# Patient Record
Sex: MALE | Race: WHITE | NOT HISPANIC OR LATINO | Employment: FULL TIME | URBAN - METROPOLITAN AREA
[De-identification: names, ages, dates, MRNs, and addresses within clinical notes are randomized per-mention and may not be internally consistent; named-entity substitution may affect disease eponyms.]

---

## 2023-05-08 LAB
ALANINE AMINOTRANSFERASE (SGPT) (U/L) IN SER/PLAS: 33 U/L (ref 10–52)
ALBUMIN (G/DL) IN SER/PLAS: 4.6 G/DL (ref 3.4–5)
ALKALINE PHOSPHATASE (U/L) IN SER/PLAS: 92 U/L (ref 33–120)
ANION GAP IN SER/PLAS: 15 MMOL/L (ref 10–20)
ASPARTATE AMINOTRANSFERASE (SGOT) (U/L) IN SER/PLAS: 28 U/L (ref 9–39)
BASOPHILS (10*3/UL) IN BLOOD BY AUTOMATED COUNT: 0.03 X10E9/L (ref 0–0.1)
BASOPHILS/100 LEUKOCYTES IN BLOOD BY AUTOMATED COUNT: 0.5 % (ref 0–2)
BILIRUBIN TOTAL (MG/DL) IN SER/PLAS: 1.5 MG/DL (ref 0–1.2)
CALCIUM (MG/DL) IN SER/PLAS: 9.2 MG/DL (ref 8.6–10.3)
CARBON DIOXIDE, TOTAL (MMOL/L) IN SER/PLAS: 25 MMOL/L (ref 21–32)
CHLORIDE (MMOL/L) IN SER/PLAS: 105 MMOL/L (ref 98–107)
CHOLESTEROL (MG/DL) IN SER/PLAS: 233 MG/DL (ref 0–199)
CHOLESTEROL IN HDL (MG/DL) IN SER/PLAS: 40.2 MG/DL
CHOLESTEROL/HDL RATIO: 5.8
CREATININE (MG/DL) IN SER/PLAS: 1.17 MG/DL (ref 0.5–1.3)
EOSINOPHILS (10*3/UL) IN BLOOD BY AUTOMATED COUNT: 0.11 X10E9/L (ref 0–0.7)
EOSINOPHILS/100 LEUKOCYTES IN BLOOD BY AUTOMATED COUNT: 2 % (ref 0–6)
ERYTHROCYTE DISTRIBUTION WIDTH (RATIO) BY AUTOMATED COUNT: 12.2 % (ref 11.5–14.5)
ERYTHROCYTE MEAN CORPUSCULAR HEMOGLOBIN CONCENTRATION (G/DL) BY AUTOMATED: 34.5 G/DL (ref 32–36)
ERYTHROCYTE MEAN CORPUSCULAR VOLUME (FL) BY AUTOMATED COUNT: 98 FL (ref 80–100)
ERYTHROCYTES (10*6/UL) IN BLOOD BY AUTOMATED COUNT: 5.13 X10E12/L (ref 4.5–5.9)
ESTIMATED AVERAGE GLUCOSE FOR HBA1C: 100 MG/DL
GFR MALE: 77 ML/MIN/1.73M2
GLUCOSE (MG/DL) IN SER/PLAS: 75 MG/DL (ref 74–99)
HEMATOCRIT (%) IN BLOOD BY AUTOMATED COUNT: 50.2 % (ref 41–52)
HEMOGLOBIN (G/DL) IN BLOOD: 17.3 G/DL (ref 13.5–17.5)
HEMOGLOBIN A1C/HEMOGLOBIN TOTAL IN BLOOD: 5.1 %
IMMATURE GRANULOCYTES/100 LEUKOCYTES IN BLOOD BY AUTOMATED COUNT: 0.2 % (ref 0–0.9)
LDL: 171 MG/DL (ref 0–99)
LEUKOCYTES (10*3/UL) IN BLOOD BY AUTOMATED COUNT: 5.6 X10E9/L (ref 4.4–11.3)
LYMPHOCYTES (10*3/UL) IN BLOOD BY AUTOMATED COUNT: 1.69 X10E9/L (ref 1.2–4.8)
LYMPHOCYTES/100 LEUKOCYTES IN BLOOD BY AUTOMATED COUNT: 30.1 % (ref 13–44)
MONOCYTES (10*3/UL) IN BLOOD BY AUTOMATED COUNT: 0.48 X10E9/L (ref 0.1–1)
MONOCYTES/100 LEUKOCYTES IN BLOOD BY AUTOMATED COUNT: 8.6 % (ref 2–10)
NEUTROPHILS (10*3/UL) IN BLOOD BY AUTOMATED COUNT: 3.29 X10E9/L (ref 1.2–7.7)
NEUTROPHILS/100 LEUKOCYTES IN BLOOD BY AUTOMATED COUNT: 58.6 % (ref 40–80)
PLATELETS (10*3/UL) IN BLOOD AUTOMATED COUNT: 186 X10E9/L (ref 150–450)
POTASSIUM (MMOL/L) IN SER/PLAS: 4.7 MMOL/L (ref 3.5–5.3)
PROTEIN TOTAL: 7.3 G/DL (ref 6.4–8.2)
SODIUM (MMOL/L) IN SER/PLAS: 140 MMOL/L (ref 136–145)
THYROTROPIN (MIU/L) IN SER/PLAS BY DETECTION LIMIT <= 0.05 MIU/L: 2.25 MIU/L (ref 0.44–3.98)
TRIGLYCERIDE (MG/DL) IN SER/PLAS: 111 MG/DL (ref 0–149)
UREA NITROGEN (MG/DL) IN SER/PLAS: 17 MG/DL (ref 6–23)
VLDL: 22 MG/DL (ref 0–40)

## 2023-11-13 ENCOUNTER — OFFICE VISIT (OUTPATIENT)
Dept: URGENT CARE | Facility: CLINIC | Age: 48
End: 2023-11-13
Payer: COMMERCIAL

## 2023-11-13 VITALS
TEMPERATURE: 97.8 F | WEIGHT: 228 LBS | DIASTOLIC BLOOD PRESSURE: 82 MMHG | HEART RATE: 57 BPM | OXYGEN SATURATION: 97 % | SYSTOLIC BLOOD PRESSURE: 120 MMHG | BODY MASS INDEX: 35.71 KG/M2 | RESPIRATION RATE: 20 BRPM

## 2023-11-13 DIAGNOSIS — S01.412D LACERATION OF LEFT CHEEK, SUBSEQUENT ENCOUNTER: Primary | ICD-10-CM

## 2023-11-13 PROCEDURE — 1036F TOBACCO NON-USER: CPT | Performed by: FAMILY MEDICINE

## 2023-11-13 PROCEDURE — 99202 OFFICE O/P NEW SF 15 MIN: CPT | Performed by: FAMILY MEDICINE

## 2023-11-13 NOTE — PATIENT INSTRUCTIONS
Can get wet  Leave steri strips in placed until they fall off  Return if redness or pus or problems with repair

## 2023-11-13 NOTE — PROGRESS NOTES
_____________MEDCO-14 Physician's Report of Work Ability [facility]-[id number]_________________      Injured Worker:  Date of injury: Claim number:   Magdy Valdes       Date of last appointment /examination: Date of this appointment /examination:  Date of next appointment /examination:       11/13/23       Employer name: Injured worker's position of employment at time of injury:            MEDCO-14 submission   1.  Please select one of the following options: I have never completed a MEDCO-14. Proceed to section 2.         Employment/Occupation (Complete this section and proceed to section 3)  2. Updates made to Employment/Occupation Section:     Have you reviewed the description of the injured worker's job held on the date of the injury (former position of employment)? No       Work Status/Injured worker's capabilities.   3a. Updates made to work status/injured worker's capabilities:   Does the injured worker have any physical or health restrictions related to allowed conditions in the claim? No, the injured worker is released to work as of the date of this exam. Proceed to Section 8             Treating physician signature - mandatory:  8. I certify the information on this form is correct to the best of my knowledge. I am aware that any person who knowingly makes a false statement, misrepresentation, concealment of fact or any other act of fraud to obtain payment as provided by [facility], or who knowingly accepts payment to which that person is not entitled, is subject to felony criminal prosecution and may be punished, under appropriate criminal provisions. by a fine or imprisonment or both.     Treating physician's name (please print legibly): Jeanine Jones MD  [facility] provider ([place]) number:    Complete Address, Telephone, Fax number and Date:     Treating physician's signature: Jeanine Jones MD   49 yo  Workman's comp  Went to ED for laceration left cheek after cut by piece of metal 11/2/23.  Here for suture  removal  Told to have removal in 7-10 days  Reports no problems with laceration- no redness or discharge  Got dtap in ED    Problem list  Htn  Hyperlipidemia    Exam  Blood pressure 120/82, pulse 57, temperature 36.6 °C (97.8 °F), resp. rate 20, weight 103 kg (228 lb), SpO2 97 %.  Well approximated suture line left cheek  No redness or discharge  4 sutures removed  Steri strips applied    A/p  Laceration well healed.  Can get wet  Leave steri strips in placed until they fall off  Return if redness or pus or problems with repair  Jeanine Jones MD

## 2023-11-13 NOTE — PROGRESS NOTES
_____________MEDCO-14 Physician's Report of Work Ability [facility]-3914_________________      Injured Worker:  Date of injury: Claim number:   Magdy Valdes       Date of last appointment /examination: Date of this appointment /examination:  Date of next appointment /examination:      11/2/23 11/13/23       Employer name: Injured worker's position of employment at time of injury:            MEDCO-14 submission   1.  Please select one of the following options: I have never completed a MEDCO-14. Proceed to section 2.         Employment/Occupation (Complete this section and proceed to section 3)  2. Updates made to Employment/Occupation Section:     Have you reviewed the description of the injured worker's job held on the date of the injury (former position of employment)? no       Work Status/Injured worker's capabilities.   3a. Updates made to work status/injured worker's capabilities:   Does the injured worker have any physical or health restrictions related to allowed conditions in the claim? No, the injured worker is released to work as of the date of this exam. Proceed to Section 8     3b. If restrictions, can the injured worker return to full duties of his/her job held on the date of injury (former position of employment)?     Yes. The injured worker is released to work as of the date of this exam. Proceed to Section 8.   3c. Please indicate which of the activities listed below the injured worker can perform (even if the response to 3B is No.)     The injured worker is not released to the former position of employment but may return to available and appropriate work with restrictions, the possible return to work date: can return today.     The injured worker can perform simple grasping with: Both  The injured worker can perform repetitive wrist motion with: Both  The injured worker's dominant hand is: Right  The injured worker can perform repetitive actions to operate foot controls or motor vehicles with: Both    If  "the injured worker is taking prescribed medications for the allowed conditions in this claim, can the injured worker safely:   *Operate heavy machinery: Yes  *Drive: Yes  *Perform other critical job tasks as defined by any source listed above in section 2: No     Please indicate the following: Never, Occasionally, Frequently, Continuously    Activity   Bend: Continuously  Squat / kneel: Continuously  Twist / turn: Continuously  Climb: Continuously Reach above shoulder: Continuously  Type / Keyboard: Continuously  Work w/cold substances: Continuously  Work w/hot substances: Continuously      Lifting/Carrying                                      Pushing/pulling  0 - 10 lbs: Continuously  11 - 20 lbs: Continuously  21 - 40 lbs: Continuously  41 - 60 lbs: Continuously  61 - 100 lbs: Continuously 0 - 25 lbs: Continuously  26 - 40 lbs: Continuously  41 -  60 lbs: Continuously  61 - 100 lbs: Continuously  100 + lbs: Continuously       How many total hours can the injured worker work?  5 days per week, 8 hours per day     In an eight-hour workday, how many total hours is the injured worker potentially able to work?      Sit: 8 hours,  Continuously  Walk: 8 hours, Continuously  Stand: 8 hours, Continuously          Does the injured worker have any functional restrictions based only on allowed psychological conditions? No    *Note: If Yes is indicated please reference the MEDCO-16 as needed.     Additionally, in this space please provide any additional information addressing the  injured worker's capabilities and/or job accommodations which may not be addressed above. N/A       Disability information   4a.  *Note: If 3B above is \"No\" or dates updated - all 4A fields, including site/location if applicable must be completed        Complete the chart below and furnish the narrative description of the diagnosis(es), site/location, if applicable, and ICD code for the conditions being treated due to the work- related " injury/disease.  Please indicate if the condition is preventing the injured worker from returning to job duties he/she held on the date of injury.       Narrative description of the work-related allowed condition Site/Location if applicable ICD Code Is the condition preventing full duty release to the job injured worker held on date of injury?       No      No      No                          4B. List all other relevant conditions that impact treatment of the conditions listed above (e.g., co-morbidities or not yet allowed conditions).        Clinical findings:    5. You can reference office notes in lieu of writing clinical findings below.  Updates:     The injured worker is progressing: As expected    Provide your clinical and objective findings supporting your medical opinion outlined on this form.  List barriers to return to work and reason, for the injured worker's delay in recovery.   N/A     Maximum medical improvement (MMI):  6. Updates:   MMI is a treatment plateau (static or well-stabilized) at which no fundamental       functional or physiological change can be expected within reasonable medical       probability, in spite of continuing medical or rehabilitative procedures.     Has the work-related injury(s) or occupational disease reached MMI based on the definition above? No, the proposed treatment plan, including estimated duration of each treatment  .      *Note: An injured worker may need supportive treatment to maintain his or her level of function after reaching MMI. Thus, periodic medical treatment may still be requested and provided.      Vocational rehabilitation:   7. Updates:   Vocational rehabilitation is an individualized and voluntary program for an eligible injured worker who needs assistance in safely returning to work or in retaining employment.  This program can be tailored around an injured worker's restrictions and may provide job seeking skills or necessary retraining. Is the injured  worker a candidate for vocational rehabilitation services focusing on return to work? Yes     Treating physician signature - mandatory:  8. I certify the information on this form is correct to the best of my knowledge. I am aware that any person who knowingly makes a false statement, misrepresentation, concealment of fact or any other act of fraud to obtain payment as provided by [facility], or who knowingly accepts payment to which that person is not entitled, is subject to felony criminal prosecution and may be punished, under appropriate criminal provisions. by a fine or imprisonment or both.     Treating physician's name (please print legibly): Jeanine Jones MD  [facility] provider ([facility]) number:    Complete Address, Telephone, Fax number and Date:     Treating physician's signature: Jeanine Jones MD

## 2024-03-15 PROBLEM — E78.00 PURE HYPERCHOLESTEROLEMIA: Status: ACTIVE | Noted: 2024-03-15

## 2024-03-15 PROBLEM — N52.9 ERECTILE DYSFUNCTION: Status: ACTIVE | Noted: 2024-03-15

## 2024-03-15 PROBLEM — R94.31 ABNORMAL EKG: Status: ACTIVE | Noted: 2024-03-15

## 2024-03-15 PROBLEM — I51.89 DIASTOLIC DYSFUNCTION: Status: ACTIVE | Noted: 2024-03-15

## 2024-03-15 PROBLEM — F41.8 DEPRESSION WITH ANXIETY: Status: ACTIVE | Noted: 2024-03-15

## 2024-03-15 PROBLEM — M79.671 PAIN OF RIGHT HEEL: Status: ACTIVE | Noted: 2024-03-15

## 2024-03-15 PROBLEM — J30.2 OTHER SEASONAL ALLERGIC RHINITIS: Status: ACTIVE | Noted: 2024-03-15

## 2024-03-15 PROBLEM — I10 HYPERTENSION: Status: ACTIVE | Noted: 2024-03-15

## 2024-03-15 PROBLEM — S01.81XA FACIAL LACERATION: Status: ACTIVE | Noted: 2023-11-02

## 2024-03-22 ENCOUNTER — OFFICE VISIT (OUTPATIENT)
Dept: CARDIOLOGY | Facility: CLINIC | Age: 49
End: 2024-03-22
Payer: COMMERCIAL

## 2024-03-22 ENCOUNTER — LAB (OUTPATIENT)
Dept: LAB | Facility: LAB | Age: 49
End: 2024-03-22
Payer: COMMERCIAL

## 2024-03-22 VITALS
WEIGHT: 229 LBS | HEART RATE: 65 BPM | BODY MASS INDEX: 35.94 KG/M2 | HEIGHT: 67 IN | OXYGEN SATURATION: 96 % | DIASTOLIC BLOOD PRESSURE: 70 MMHG | SYSTOLIC BLOOD PRESSURE: 124 MMHG

## 2024-03-22 DIAGNOSIS — E78.00 PURE HYPERCHOLESTEROLEMIA: ICD-10-CM

## 2024-03-22 DIAGNOSIS — I10 PRIMARY HYPERTENSION: ICD-10-CM

## 2024-03-22 DIAGNOSIS — E78.00 PURE HYPERCHOLESTEROLEMIA: Primary | ICD-10-CM

## 2024-03-22 LAB
ALBUMIN SERPL BCP-MCNC: 4.8 G/DL (ref 3.4–5)
ALP SERPL-CCNC: 93 U/L (ref 33–120)
ALT SERPL W P-5'-P-CCNC: 32 U/L (ref 10–52)
ANION GAP SERPL CALC-SCNC: 15 MMOL/L (ref 10–20)
AST SERPL W P-5'-P-CCNC: 30 U/L (ref 9–39)
BILIRUB SERPL-MCNC: 1.2 MG/DL (ref 0–1.2)
BUN SERPL-MCNC: 21 MG/DL (ref 6–23)
CALCIUM SERPL-MCNC: 10 MG/DL (ref 8.6–10.6)
CHLORIDE SERPL-SCNC: 102 MMOL/L (ref 98–107)
CHOLEST SERPL-MCNC: 207 MG/DL (ref 0–199)
CHOLESTEROL/HDL RATIO: 4.9
CO2 SERPL-SCNC: 24 MMOL/L (ref 21–32)
CREAT SERPL-MCNC: 1.14 MG/DL (ref 0.5–1.3)
EGFRCR SERPLBLD CKD-EPI 2021: 79 ML/MIN/1.73M*2
GLUCOSE SERPL-MCNC: 99 MG/DL (ref 74–99)
HDLC SERPL-MCNC: 42 MG/DL
LDLC SERPL CALC-MCNC: 132 MG/DL
NON HDL CHOLESTEROL: 165 MG/DL (ref 0–149)
POTASSIUM SERPL-SCNC: 4.4 MMOL/L (ref 3.5–5.3)
PROT SERPL-MCNC: 7.4 G/DL (ref 6.4–8.2)
SODIUM SERPL-SCNC: 137 MMOL/L (ref 136–145)
TRIGL SERPL-MCNC: 167 MG/DL (ref 0–149)
VLDL: 33 MG/DL (ref 0–40)

## 2024-03-22 PROCEDURE — 99213 OFFICE O/P EST LOW 20 MIN: CPT | Performed by: NURSE PRACTITIONER

## 2024-03-22 PROCEDURE — 3074F SYST BP LT 130 MM HG: CPT | Performed by: NURSE PRACTITIONER

## 2024-03-22 PROCEDURE — 80053 COMPREHEN METABOLIC PANEL: CPT

## 2024-03-22 PROCEDURE — 36415 COLL VENOUS BLD VENIPUNCTURE: CPT

## 2024-03-22 PROCEDURE — 3078F DIAST BP <80 MM HG: CPT | Performed by: NURSE PRACTITIONER

## 2024-03-22 PROCEDURE — 80061 LIPID PANEL: CPT

## 2024-03-22 PROCEDURE — 1036F TOBACCO NON-USER: CPT | Performed by: NURSE PRACTITIONER

## 2024-03-22 PROCEDURE — 93010 ELECTROCARDIOGRAM REPORT: CPT | Performed by: INTERNAL MEDICINE

## 2024-03-22 PROCEDURE — 93005 ELECTROCARDIOGRAM TRACING: CPT | Performed by: NURSE PRACTITIONER

## 2024-03-22 RX ORDER — LISINOPRIL 40 MG/1
40 TABLET ORAL DAILY
Qty: 90 TABLET | Refills: 3 | Status: SHIPPED | OUTPATIENT
Start: 2024-03-22 | End: 2025-03-22

## 2024-03-22 RX ORDER — HYDROCHLOROTHIAZIDE 25 MG/1
25 TABLET ORAL DAILY
Qty: 90 TABLET | Refills: 3 | Status: SHIPPED | OUTPATIENT
Start: 2024-03-22 | End: 2025-03-22

## 2024-03-22 RX ORDER — LISINOPRIL 40 MG/1
40 TABLET ORAL DAILY
COMMUNITY
Start: 2024-01-24 | End: 2024-03-22 | Stop reason: SDUPTHER

## 2024-03-22 RX ORDER — ATORVASTATIN CALCIUM 20 MG/1
20 TABLET, FILM COATED ORAL NIGHTLY
COMMUNITY
Start: 2024-01-24 | End: 2024-03-22 | Stop reason: SDUPTHER

## 2024-03-22 RX ORDER — ATORVASTATIN CALCIUM 20 MG/1
20 TABLET, FILM COATED ORAL NIGHTLY
Qty: 30 TABLET | Refills: 3 | Status: SHIPPED | OUTPATIENT
Start: 2024-03-22 | End: 2025-03-22

## 2024-03-22 RX ORDER — HYDROCHLOROTHIAZIDE 25 MG/1
25 TABLET ORAL DAILY
COMMUNITY
Start: 2024-02-13 | End: 2024-03-22 | Stop reason: SDUPTHER

## 2024-03-22 NOTE — PROGRESS NOTES
"Magdy Valdes is a 48 y.o. male     History Of Present Illness   3/22/24: Mr Valdes is a 48 year old obese male with hypertension and hyperlipidemia, here for a routine follow up.  He admits he did not follow up as directed and \"ran out of his hydrochlorothiazide.\"  His weight is up 2 lbs.  He denies any complaints of chest pain, shortness of breath, palpitations, lower extremity edema, dizziness or syncope.  He does complain of bilateral knee pain, but attributes this to his arthritis.      7/24/2023:Mr Valdes is here for a routine follow up of his hypertension and hyperlipidemia. At his last visit, I started him on HCTZ. His BP today is 114/77. His weight is down 5 pounds and he is tolerating this medication well. He denies any complaints of chest pain, shortness of breath, headaches, dizziness, syncope or lower extremity edema     5/25/23: Mr Valdes is here for a routine follow up of his hypertension and hyperlipidemia. He is actively trying to lose weight. He walks almost daily and is consuming a healthy low saturated fat diet. He is limiting his sodium as instructed. His weight is down 3 lbs and he is feeling frustrated with the slow loss. His BP has been averaging 140-150/80-90     5/1/23: Mr Valdes is a 47 year old obese male here for an acute visit for uncontrolled hypertension. The patient admits that he has not taken his antihypertensives since March. He did move this weekend and his best friend recently passed away. Today, his BP is 204/102. He denies headaches, blurred vision, dizziness or syncope. He also denies chest pain, shortness of breath or lower extremity edema.     1/17/2022: A virtual visit was completed today for a follow up of Mr Valdes's hypertension   . He reports his home BP's have been averaging around 150/90 at home. He denies any complaints of chest pain, shortness of breath, palpitations, headaches, dizziness, lower extremity edema or syncopal episodes.      8/13/2021: Mr Valdes is here " for a follow up of his hypertension, hyperlipidemia and to review the results of his testing. He denies any complaints of chest pain, shortness of breath, palpitations, dizziness, syncope or lower extremity edema. He ran out of his lisinopril a few days ago and did not have it refilled. His BP today is 158/94. His home readings are well controlled when he was taking the lisinopril, but began to raise when he stopped taking it.     July 12, 2021:Mr Valdes is a 45 year old male with a H/O hypertension, ED and GERD, here for further evaluation of an abnormal EKG.     The patient denies any complaints of chest pain, shortness of breath, palpitations, edema, dizziness or syncope.     He denies any H/O CVA, TIA, CAD, MI, CHF, DM OR CKD.     He was noted to be hypertensive, but admits that he has not started the lisnopril as directed. His BP today is 162/97. He denies any complaints of headache.     he does not smoke, he consumes alcohol occasionally and does admit to drinking a lot of energy drinks to stay awake       Social HX  Social History     Tobacco Use    Smoking status: Never    Smokeless tobacco: Never          Family HX  No family history on file.       Review Of Systems   Constitutional: not feeling tired.   Eyes: no eyesight problems.  No vision loss or change in vision  ENT: no hearing loss and no nosebleeds.   Cardiovascular: No intermittent leg claudication,   No chest pain, no tightness or heavy pressure  No shortness of breath,  No palpitations,  No lower extremity edema  The heart rate is regular  Respiratory: no chronic cough and no shortness of breath.   Gastrointestinal: no change in bowel habits and no blood in stools.   Genitourinary: no urinary frequency.   Skin: no skin rashes.   Neurological: No frequent falls.   No dizziness  No weakness  Denies headaches  Psychiatric: no depression and not suicidal.       + bilateral knee pain    Allergies  No Known Allergies       Vitals  Visit Vitals  BP  124/70 (BP Location: Left arm, Patient Position: Sitting)   Pulse 65           Physical Exam  Constitutional: alert and in no acute distress.   Eyes: no erythema, swelling or discharge from the eye .   Neck: neck is supple, symmetric, trachea midline, no masses  and no thyromegaly .   Pulmonary: No increased work of breathing or signs of respiratory distress    Lungs clear to auscultation.    Auscultation of the lungs revealed no expiratory wheezing, normal expiratory time and no inspiratory wheezing. no rales or crackles were heard bilaterally. no rhonchi  No friction rub. no wheezing.   No diminished breath sounds. no bronchial breath sounds.   Cardiovascular: carotid pulses 2+ bilaterally with no bruit    JVP was normal, no thrills ,   Regular rhythm, normal S1 and S2, no murmurs  the heart rate was normal normal S1, normal S2, no S3, no S4 no murmurs were heard.,   Pedal pulses 2+ bilaterally    No edema .   Abdomen: abdomen non-tender, no masses  and no hepatomegaly . No pulsatile mass noted  Skin: skin warm and dry, normal skin turgor .   Psychiatric judgment and insight is normal  and oriented to person, place and time .           Current/Home Meds    Current Outpatient Medications:     atorvastatin (Lipitor) 20 mg tablet, Take 1 tablet (20 mg) by mouth once daily at bedtime., Disp: 30 tablet, Rfl: 3    hydroCHLOROthiazide (HYDRODiuril) 25 mg tablet, Take 1 tablet (25 mg) by mouth once daily., Disp: 90 tablet, Rfl: 3    lisinopril 40 mg tablet, Take 1 tablet (40 mg) by mouth once daily., Disp: 90 tablet, Rfl: 3       Labs           EKG Findings  EKG: today shows NSR        Cardiac Service Results:  ECHOCARDIOGRAM : EF 55-60% WITH GRADE I DIASTOLIC DYSFUNCTION       Assessment/Plan      HYPERTENSION:  BP is well controlled at 124/70.  He will continue his hydrochlorothiazide and lisinopril.  He is to consume a low sodium, low saturated fat diet and exercise for 150 mins a week and lose 1-2 pounds per  week.    HYPERLIPIDEMIA:  He will have a fasting lipid panel and cmp done after this visit.  He is to continue his statin and lifestyle modifications.    Follow up in 6 months or sooner for any questions, concerns or complaints.

## 2024-03-23 LAB
ATRIAL RATE: 60 BPM
P AXIS: 103 DEGREES
P OFFSET: 205 MS
P ONSET: 149 MS
PR INTERVAL: 150 MS
Q ONSET: 224 MS
QRS COUNT: 10 BEATS
QRS DURATION: 86 MS
QT INTERVAL: 408 MS
QTC CALCULATION(BAZETT): 408 MS
QTC FREDERICIA: 408 MS
R AXIS: 0 DEGREES
T AXIS: 45 DEGREES
T OFFSET: 428 MS
VENTRICULAR RATE: 60 BPM

## 2024-04-17 ENCOUNTER — OFFICE VISIT (OUTPATIENT)
Dept: PRIMARY CARE | Facility: CLINIC | Age: 49
End: 2024-04-17
Payer: COMMERCIAL

## 2024-04-17 VITALS
BODY MASS INDEX: 36.6 KG/M2 | HEART RATE: 80 BPM | HEIGHT: 67 IN | TEMPERATURE: 97.9 F | OXYGEN SATURATION: 97 % | RESPIRATION RATE: 16 BRPM | SYSTOLIC BLOOD PRESSURE: 107 MMHG | DIASTOLIC BLOOD PRESSURE: 74 MMHG | WEIGHT: 233.2 LBS

## 2024-04-17 DIAGNOSIS — M25.562 PAIN IN BOTH KNEES, UNSPECIFIED CHRONICITY: Primary | ICD-10-CM

## 2024-04-17 DIAGNOSIS — M25.469 KNEE SWELLING: ICD-10-CM

## 2024-04-17 DIAGNOSIS — M25.561 PAIN IN BOTH KNEES, UNSPECIFIED CHRONICITY: Primary | ICD-10-CM

## 2024-04-17 PROCEDURE — 3074F SYST BP LT 130 MM HG: CPT | Performed by: NURSE PRACTITIONER

## 2024-04-17 PROCEDURE — 1036F TOBACCO NON-USER: CPT | Performed by: NURSE PRACTITIONER

## 2024-04-17 PROCEDURE — 3078F DIAST BP <80 MM HG: CPT | Performed by: NURSE PRACTITIONER

## 2024-04-17 PROCEDURE — 99213 OFFICE O/P EST LOW 20 MIN: CPT | Performed by: NURSE PRACTITIONER

## 2024-04-17 RX ORDER — METHOCARBAMOL 500 MG/1
500 TABLET, FILM COATED ORAL 3 TIMES DAILY PRN
Qty: 30 TABLET | Refills: 0 | Status: SHIPPED | OUTPATIENT
Start: 2024-04-17 | End: 2024-04-27

## 2024-04-17 ASSESSMENT — ENCOUNTER SYMPTOMS
COUGH: 0
ACTIVITY CHANGE: 0
STRIDOR: 0
WOUND: 0
MYALGIAS: 0
CHILLS: 0
APPETITE CHANGE: 0
FATIGUE: 0
COLOR CHANGE: 0
FEVER: 0
DIAPHORESIS: 0
SHORTNESS OF BREATH: 0
PALPITATIONS: 0
UNEXPECTED WEIGHT CHANGE: 0
VOMITING: 0
NAUSEA: 0
CHEST TIGHTNESS: 0
CHOKING: 0
WHEEZING: 0
APNEA: 0
ABDOMINAL PAIN: 0
BACK PAIN: 0
ARTHRALGIAS: 1
FLANK PAIN: 0

## 2024-04-17 NOTE — PROGRESS NOTES
"Subjective   Patient ID: Magdy Valdes is a 48 y.o. male who presents for chronic bilateral knee pain.     HPI  Patient in office for chronic bilateral knee pain x years. He was seen about 1 year ago in a urgent care for right knee swelling after playing golf. He has been having intermittent right knee swelling since that time. No swelling currently. Over the past couple of weeks, the patient has been having an intermittent sensation that his right knee might give out. No symptoms currently. Knee pain is most pronounced at night. The pain responds somewhat to OTC pain medication. The patient is ambulatory. No loss of dependent sensation.No other concerns today.     Review of Systems   Constitutional:  Negative for activity change, appetite change, chills, diaphoresis, fatigue, fever and unexpected weight change.   Respiratory:  Negative for apnea, cough, choking, chest tightness, shortness of breath, wheezing and stridor.    Cardiovascular:  Negative for chest pain, palpitations and leg swelling.   Gastrointestinal:  Negative for abdominal pain, nausea and vomiting.   Genitourinary:  Negative for flank pain.   Musculoskeletal:  Positive for arthralgias. Negative for back pain and myalgias.        Intermittent weakness and swelling of right knee   Skin:  Negative for color change, pallor, rash and wound.       Objective   /74   Pulse 80   Temp 36.6 °C (97.9 °F) (Temporal)   Resp 16   Ht 1.702 m (5' 7\")   Wt 106 kg (233 lb 3.2 oz)   SpO2 97%   BMI 36.52 kg/m²     Physical Exam  Constitutional:       Appearance: Normal appearance.   Cardiovascular:      Rate and Rhythm: Normal rate.      Pulses: Normal pulses.      Heart sounds: Normal heart sounds.   Pulmonary:      Effort: Pulmonary effort is normal.   Musculoskeletal:         General: Tenderness present. No swelling or deformity.      Right lower leg: No edema.      Left lower leg: No edema.      Comments: Mild to moderate tenderness to palpation in " right lateral suprapatellar area; L/R ROM flexion and extension slightly decreased   Skin:     General: Skin is warm.      Coloration: Skin is not jaundiced or pale.      Findings: No bruising, erythema, lesion or rash.   Neurological:      General: No focal deficit present.      Mental Status: He is alert.      Motor: No weakness.      Gait: Gait normal.       Assessment/Plan     Exam findings reviewed with patient. Xray order, referral, and medications discussed. Patient may continue to use OTC Tylenol and Lidocaine patches for pain as needed. We will call with image results and update the patient on the plan of care. Supportive measures discussed. May use cold therapy (rest-ice-and elevate). Activity as tolerated. Follow up in 3 months for a physical or earlier as needed.

## 2024-04-24 ENCOUNTER — TELEPHONE (OUTPATIENT)
Dept: PRIMARY CARE | Facility: CLINIC | Age: 49
End: 2024-04-24
Payer: COMMERCIAL

## 2024-04-24 DIAGNOSIS — M25.561 PAIN IN BOTH KNEES, UNSPECIFIED CHRONICITY: Primary | ICD-10-CM

## 2024-04-24 DIAGNOSIS — M25.562 PAIN IN BOTH KNEES, UNSPECIFIED CHRONICITY: Primary | ICD-10-CM

## 2024-04-24 NOTE — TELEPHONE ENCOUNTER
Abby, [facility], #[phone], fax#[phone]. Received order for Xray left knee, but DX states pain in both knees. Need another order adding the right knee, or change DX.

## 2024-04-25 ENCOUNTER — APPOINTMENT (OUTPATIENT)
Dept: ORTHOPEDIC SURGERY | Facility: CLINIC | Age: 49
End: 2024-04-25
Payer: COMMERCIAL

## 2024-04-29 ENCOUNTER — OFFICE VISIT (OUTPATIENT)
Dept: ORTHOPEDIC SURGERY | Facility: CLINIC | Age: 49
End: 2024-04-29
Payer: COMMERCIAL

## 2024-04-29 VITALS — WEIGHT: 227 LBS | HEIGHT: 67 IN | BODY MASS INDEX: 35.63 KG/M2

## 2024-04-29 DIAGNOSIS — M25.562 PAIN IN BOTH KNEES, UNSPECIFIED CHRONICITY: ICD-10-CM

## 2024-04-29 DIAGNOSIS — M25.561 PAIN IN BOTH KNEES, UNSPECIFIED CHRONICITY: ICD-10-CM

## 2024-04-29 DIAGNOSIS — M17.11 PATELLOFEMORAL ARTHRITIS OF RIGHT KNEE: ICD-10-CM

## 2024-04-29 PROCEDURE — 20610 DRAIN/INJ JOINT/BURSA W/O US: CPT | Performed by: ORTHOPAEDIC SURGERY

## 2024-04-29 PROCEDURE — 1036F TOBACCO NON-USER: CPT | Performed by: ORTHOPAEDIC SURGERY

## 2024-04-29 PROCEDURE — 99203 OFFICE O/P NEW LOW 30 MIN: CPT | Performed by: ORTHOPAEDIC SURGERY

## 2024-04-29 RX ORDER — LIDOCAINE HYDROCHLORIDE 10 MG/ML
2 INJECTION INFILTRATION; PERINEURAL
Status: COMPLETED | OUTPATIENT
Start: 2024-04-29 | End: 2024-04-29

## 2024-04-29 RX ORDER — TRIAMCINOLONE ACETONIDE 40 MG/ML
40 INJECTION, SUSPENSION INTRA-ARTICULAR; INTRAMUSCULAR
Status: COMPLETED | OUTPATIENT
Start: 2024-04-29 | End: 2024-04-29

## 2024-04-29 RX ADMIN — TRIAMCINOLONE ACETONIDE 40 MG: 40 INJECTION, SUSPENSION INTRA-ARTICULAR; INTRAMUSCULAR at 16:16

## 2024-04-29 RX ADMIN — LIDOCAINE HYDROCHLORIDE 2 ML: 10 INJECTION INFILTRATION; PERINEURAL at 16:16

## 2024-04-29 NOTE — PROGRESS NOTES
Subjective    Patient ID: Magdy Valdes is a 48 y.o. male.    Chief Complaint: OTHER (BILATERAL KNEE PAIN FOR 7 MONTHS./NKI)       48-year-old male who presents for evaluation of bilateral knee pain ongoing for the past 6 to 7 months.  Right is worse than the left.  Stiffness after prolonged sitting.  Pain going up and down steps.  No specific injury but he feels it is aggravated by overactivity including walking and playing golf.  He has tried rest, modifications of activities, muscle relaxant as prescribed by his PCP as well as Biofreeze and ibuprofen.  All of these have been of very limited benefit.  He does note swelling more so in the right knee than the left knee    This patient's past medical, social, and family history were reviewed as well as a review of systems including updates on the patient's information encounter sheet  Works in a manual labor position  Denies a history diabetes    Physical Examination  Constitutional: Patient's height and weight reviewed, appears well kempt  Psychiatric: Alert and oriented ×3, appropriate mood and behavior  Pulmonary: Breathing appears nonlabored, no apparent distress  Lymphatic: No appreciable lymphadenopathy to both the upper and lower extremities  Skin: No open lesions, rashes, ulcerations  Neurologic: Gross motor and sensory exam appear intact (except for abnormalities noted in the below muscle skeletal exam)    Musculoskeletal: There appears to be satisfactory motion of each hip without any significant groin or thigh pain elicited.  Each knee demonstrates an effusion with the right side being mild and the left side being very small.  There is no erythema nor warmth in either knee.  Patellofemoral crepitus is significant in each knee slightly more so on the right knee than the left knee.  Negative Carlee examination.    X-rays performed today and reviewed at length by myself demonstrate satisfactory joint spaces maintained between the distal femur and tibia.  On  the lateral view there is some small spur formation on the right knee posteriorly as well as small spur formation off of the patella    Assessment: Mild bilateral patellofemoral arthritis    Plan: An extended discussion ensued with the patient regarding the treatment options for their right knee condition. This included both nonoperative and operative treatment options.. The patient will continue with modifications of activities of daily living as well as an exercise program. As the patient desired an intra-articular knee injection of Kenalog/lidocaine was performed today and tolerated well.. The patient will observe to see if the injection is of benefit. Follow-up on a when necessary basis.    Begin a formal program of physical therapy for short arc quad strengthening    May consider left knee injection in the future if the right knee injection is of benefit.    L Inj/Asp: R knee on 4/29/2024 4:16 PM  Indications: pain  Details: 22 G needle, anterolateral approach  Medications: 40 mg triamcinolone acetonide 40 mg/mL; 2 mL lidocaine 10 mg/mL (1 %)  Consent was given by the patient. Patient was prepped and draped in the usual sterile fashion.               Current Outpatient Medications:     atorvastatin (Lipitor) 20 mg tablet, Take 1 tablet (20 mg) by mouth once daily at bedtime., Disp: 30 tablet, Rfl: 3    hydroCHLOROthiazide (HYDRODiuril) 25 mg tablet, Take 1 tablet (25 mg) by mouth once daily., Disp: 90 tablet, Rfl: 3    lisinopril 40 mg tablet, Take 1 tablet (40 mg) by mouth once daily., Disp: 90 tablet, Rfl: 3    methocarbamol (Robaxin) 500 mg tablet, Take 1 tablet (500 mg) by mouth 3 times a day as needed for muscle spasms for up to 10 days., Disp: 30 tablet, Rfl: 0

## 2024-07-24 ENCOUNTER — APPOINTMENT (OUTPATIENT)
Dept: PRIMARY CARE | Facility: CLINIC | Age: 49
End: 2024-07-24
Payer: COMMERCIAL

## 2024-07-24 VITALS
TEMPERATURE: 97.1 F | HEART RATE: 71 BPM | DIASTOLIC BLOOD PRESSURE: 60 MMHG | WEIGHT: 226.6 LBS | BODY MASS INDEX: 35.56 KG/M2 | OXYGEN SATURATION: 98 % | RESPIRATION RATE: 16 BRPM | SYSTOLIC BLOOD PRESSURE: 107 MMHG | HEIGHT: 67 IN

## 2024-07-24 DIAGNOSIS — Z12.11 SCREEN FOR COLON CANCER: ICD-10-CM

## 2024-07-24 DIAGNOSIS — E66.9 OBESITY (BMI 30-39.9): ICD-10-CM

## 2024-07-24 DIAGNOSIS — Z12.5 SCREENING FOR PROSTATE CANCER: ICD-10-CM

## 2024-07-24 DIAGNOSIS — I10 HYPERTENSION, UNSPECIFIED TYPE: ICD-10-CM

## 2024-07-24 DIAGNOSIS — D64.9 ANEMIA, UNSPECIFIED TYPE: ICD-10-CM

## 2024-07-24 DIAGNOSIS — Z11.59 NEED FOR HEPATITIS B SCREENING TEST: ICD-10-CM

## 2024-07-24 DIAGNOSIS — Z00.00 HEALTHCARE MAINTENANCE: Primary | ICD-10-CM

## 2024-07-24 PROCEDURE — 82607 VITAMIN B-12: CPT

## 2024-07-24 PROCEDURE — 36415 COLL VENOUS BLD VENIPUNCTURE: CPT

## 2024-07-24 PROCEDURE — 83550 IRON BINDING TEST: CPT

## 2024-07-24 PROCEDURE — 84153 ASSAY OF PSA TOTAL: CPT

## 2024-07-24 PROCEDURE — 99396 PREV VISIT EST AGE 40-64: CPT | Performed by: NURSE PRACTITIONER

## 2024-07-24 PROCEDURE — 86706 HEP B SURFACE ANTIBODY: CPT

## 2024-07-24 PROCEDURE — 84443 ASSAY THYROID STIM HORMONE: CPT

## 2024-07-24 PROCEDURE — 3008F BODY MASS INDEX DOCD: CPT | Performed by: NURSE PRACTITIONER

## 2024-07-24 PROCEDURE — 85025 COMPLETE CBC W/AUTO DIFF WBC: CPT

## 2024-07-24 PROCEDURE — 3074F SYST BP LT 130 MM HG: CPT | Performed by: NURSE PRACTITIONER

## 2024-07-24 PROCEDURE — 3078F DIAST BP <80 MM HG: CPT | Performed by: NURSE PRACTITIONER

## 2024-07-24 PROCEDURE — 82728 ASSAY OF FERRITIN: CPT

## 2024-07-24 PROCEDURE — 1036F TOBACCO NON-USER: CPT | Performed by: NURSE PRACTITIONER

## 2024-07-24 PROCEDURE — 83540 ASSAY OF IRON: CPT

## 2024-07-24 NOTE — PROGRESS NOTES
Subjective   Patient ID: Magdy Valdes is a 48 y.o. male who presents for Annual Exam.    HPI   . Patient describes health as good. Patient denies vision changes and hearing loss.     Lifestyle: . Patient reports healthy diet. Patient exercises regularly. Patient does not smoke; he drinks alcohol occasionally. No drug use.     Male  Testicular cancer screening: Reviewed and current. Does monthly KYLIE  Prostate cancer screening: Reviewed and current. Order PSA  Colon cancer screening: Reviewed and current. Order Cologuard   Vision/hearing: Reviewed and current.     Immunizations:  TDaP: 2023  COVID: patient will get booster at pharmacy  Flu: patient declines  Hep B: check titer     Concerns:  -Patient sees cardiologist for HTN and hyperlipidemia  -Multiple nevi; patient sees dermatologist  -Obesity; advocated healthy diet and exercise    Review of Systems  Constitutional:  Negative for activity change, appetite change, chills, diaphoresis, fatigue, fever and unexpected weight change.   HENT:  Negative for congestion, dental problem, drooling, ear discharge, ear pain, facial swelling, hearing loss, mouth sores, nosebleeds, postnasal drip, rhinorrhea, sinus pressure, sinus pain, sneezing, sore throat, tinnitus, trouble swallowing and voice change. Eyes:  Negative for photophobia, pain, discharge, redness, itching and visual disturbance.   Respiratory:  Negative for apnea, cough, choking, chest tightness, shortness of breath, wheezing and stridor.    Cardiovascular:  Negative for chest pain, palpitations and leg swelling.   Gastrointestinal:  Negative for abdominal distention, abdominal pain, anal bleeding, blood in stool, constipation, diarrhea, nausea, rectal pain and vomiting.   Endocrine: Negative for cold intolerance, heat intolerance, polydipsia, polyphagia and polyuria.   Genitourinary:  Negative for decreased urine volume, difficulty urinating, dysuria, enuresis, flank pain, frequency, hematuria,  "and urgency.   Musculoskeletal:  Negative for arthralgias, back pain, gait problem, joint swelling, myalgias, neck pain and neck stiffness.   Skin:  Negative for color change, pallor, rash and wound.   Neurological:  Negative for dizziness, tremors, seizures, syncope, facial asymmetry, speech difficulty, weakness, light-headedness, numbness and headaches.   Hematological:  Negative for adenopathy. Does not bruise/bleed easily.   Psychiatric/Behavioral:  Negative for agitation, behavioral problems, confusion, hyperactivity, sleep disturbance, decreased concentration, hallucinations, self-injury, and suicidal ideas. Negative for depression. Negative for anxiety.      Objective   /60   Pulse 71   Temp 36.2 °C (97.1 °F) (Temporal)   Resp 16   Ht 1.689 m (5' 6.5\")   Wt 103 kg (226 lb 9.6 oz)   SpO2 98%   BMI 36.03 kg/m²     Physical Exam  Constitutional:       Appearance: Normal appearance.   Neurological:      Mental Status: He is alert.   HENT:      Right Ear: Tympanic membrane, ear canal and external ear normal.      Left Ear: Tympanic membrane, ear canal and external ear normal.   Head: Normocephalic.      Nose: Nose normal.      Mouth/Throat:      Mouth: Mucous membranes are moist.      Pharynx: Oropharynx is clear.   Eyes:      General: No scleral icterus.        Right eye: No discharge.         Left eye: No discharge.      Conjunctiva/sclera: Conjunctivae normal.      Pupils: Pupils are equal, round, and reactive to light.   Neck:      Vascular: No carotid bruit.   Cardiovascular:      Rate and Rhythm: Normal rate and regular rhythm.      Pulses: Normal pulses.      Heart sounds: Normal heart sounds.   Pulmonary:      Effort: Pulmonary effort is normal.      Breath sounds: Normal breath sounds.   Abdominal:      General: Abdomen is flat. Bowel sounds are normal. There is no distension.      Palpations: Abdomen is soft. There is no mass.      Tenderness: There is no abdominal tenderness. There is no " guarding or rebound.      Hernia: No hernia is present.   Musculoskeletal:         General: No swelling, tenderness, deformity or signs of injury.      Cervical back: Normal range of motion and neck supple. No rigidity or tenderness.      Right lower leg: No edema.      Left lower leg: No edema.   Lymphadenopathy:      Cervical: No cervical adenopathy.   Skin:     General: Skin is warm.      Coloration: Skin is not jaundiced or pale.      Findings: No bruising, erythema, lesion.     No rash. Multiple nevi.  Neurological:      General: No focal deficit present.      Mental Status: She is alert and oriented to person, place, and time.      Cranial Nerves: No cranial nerve deficit.      Sensory: No sensory deficit.      Motor: No weakness.      Coordination: Coordination normal.      Gait: Gait normal.   Psychiatric:         Mood and Affect: Mood normal.         Behavior: Behavior normal.         Thought Content: Thought content normal.    Judgment: Judgment normal.      Assessment/Plan     Exam findings reviewed with patient. Health screens, lab orders, specialty provider follow-ups, referrals, and immunizations discussed. We will call with lab results when received and update the plan of care. Follow-up in 1 years for a physical or earlier as needed.        Benefits of healthy lifestyle discussed: low carbohydrates/fat/sugar diet; use lean white instead of red meat; use several servings of fruit and vegetables daily; use whole grain flour instead of white flour products; cook at home frequently instead of eating out; exercise 30-90 minutes 5 x/week; good sleep hygiene; stress reduction and prevention strategies; avoid stimulants like caffeine and nicotine; avoid depressants like alcohol; maintain adequate support systems and positive relationships.

## 2024-07-25 ENCOUNTER — TELEPHONE (OUTPATIENT)
Dept: PRIMARY CARE | Facility: CLINIC | Age: 49
End: 2024-07-25
Payer: COMMERCIAL

## 2024-07-25 DIAGNOSIS — R79.89 ABNORMAL CBC: Primary | ICD-10-CM

## 2024-07-25 DIAGNOSIS — D64.9 ANEMIA, UNSPECIFIED TYPE: Primary | ICD-10-CM

## 2024-07-25 LAB
BASOPHILS # BLD AUTO: 0.03 X10*3/UL (ref 0–0.1)
BASOPHILS NFR BLD AUTO: 0.5 %
EOSINOPHIL # BLD AUTO: 0.11 X10*3/UL (ref 0–0.7)
EOSINOPHIL NFR BLD AUTO: 1.9 %
ERYTHROCYTE [DISTWIDTH] IN BLOOD BY AUTOMATED COUNT: 13.1 % (ref 11.5–14.5)
FERRITIN SERPL-MCNC: 155 NG/ML (ref 20–300)
HBV SURFACE AB SER-ACNC: <3.1 MIU/ML
HCT VFR BLD AUTO: 39.8 % (ref 41–52)
HGB BLD-MCNC: 13.7 G/DL (ref 13.5–17.5)
IMM GRANULOCYTES # BLD AUTO: 0.01 X10*3/UL (ref 0–0.7)
IMM GRANULOCYTES NFR BLD AUTO: 0.2 % (ref 0–0.9)
IRON SATN MFR SERPL: 31 % (ref 25–45)
IRON SERPL-MCNC: 115 UG/DL (ref 35–150)
LYMPHOCYTES # BLD AUTO: 1.69 X10*3/UL (ref 1.2–4.8)
LYMPHOCYTES NFR BLD AUTO: 28.7 %
MCH RBC QN AUTO: 34.3 PG (ref 26–34)
MCHC RBC AUTO-ENTMCNC: 34.4 G/DL (ref 32–36)
MCV RBC AUTO: 100 FL (ref 80–100)
MONOCYTES # BLD AUTO: 0.4 X10*3/UL (ref 0.1–1)
MONOCYTES NFR BLD AUTO: 6.8 %
NEUTROPHILS # BLD AUTO: 3.65 X10*3/UL (ref 1.2–7.7)
NEUTROPHILS NFR BLD AUTO: 61.9 %
NRBC BLD-RTO: 0 /100 WBCS (ref 0–0)
PLATELET # BLD AUTO: 197 X10*3/UL (ref 150–450)
PSA SERPL-MCNC: 0.55 NG/ML
RBC # BLD AUTO: 3.99 X10*6/UL (ref 4.5–5.9)
TIBC SERPL-MCNC: 370 UG/DL (ref 240–445)
TSH SERPL-ACNC: 2.17 MIU/L (ref 0.44–3.98)
UIBC SERPL-MCNC: 255 UG/DL (ref 110–370)
VIT B12 SERPL-MCNC: 865 PG/ML (ref 211–911)
WBC # BLD AUTO: 5.9 X10*3/UL (ref 4.4–11.3)

## 2024-07-25 NOTE — TELEPHONE ENCOUNTER
Can you please place the order for the CBC to be done in 3 months.  He is going to go to a  facility because their hours work better for him.  He asked that we also mail a list of  labs to him.  Thank you

## 2024-07-25 NOTE — RESULT ENCOUNTER NOTE
Please call the patient. Stable labs. Slight red blood cell abnormalities. Stay well- hydrated. Recheck CBC in 3 months, other labs in 1 year. No immunity to Hep B. Schedule 2 Hepsilav shots. TY

## 2024-08-09 LAB — NONINV COLON CA DNA+OCC BLD SCRN STL QL: NEGATIVE

## 2024-09-03 ENCOUNTER — APPOINTMENT (OUTPATIENT)
Dept: CARDIOLOGY | Facility: CLINIC | Age: 49
End: 2024-09-03
Payer: COMMERCIAL

## 2024-09-26 ENCOUNTER — OFFICE VISIT (OUTPATIENT)
Dept: CARDIOLOGY | Facility: CLINIC | Age: 49
End: 2024-09-26
Payer: COMMERCIAL

## 2024-09-26 VITALS
WEIGHT: 231 LBS | OXYGEN SATURATION: 96 % | DIASTOLIC BLOOD PRESSURE: 70 MMHG | SYSTOLIC BLOOD PRESSURE: 108 MMHG | BODY MASS INDEX: 36.26 KG/M2 | HEIGHT: 67 IN | HEART RATE: 86 BPM

## 2024-09-26 DIAGNOSIS — E78.00 PURE HYPERCHOLESTEROLEMIA: Primary | ICD-10-CM

## 2024-09-26 DIAGNOSIS — I25.10 CORONARY ARTERY DISEASE INVOLVING NATIVE CORONARY ARTERY OF NATIVE HEART WITHOUT ANGINA PECTORIS: ICD-10-CM

## 2024-09-26 DIAGNOSIS — I10 PRIMARY HYPERTENSION: ICD-10-CM

## 2024-09-26 PROCEDURE — 3078F DIAST BP <80 MM HG: CPT | Performed by: NURSE PRACTITIONER

## 2024-09-26 PROCEDURE — 99212 OFFICE O/P EST SF 10 MIN: CPT | Performed by: NURSE PRACTITIONER

## 2024-09-26 PROCEDURE — 3008F BODY MASS INDEX DOCD: CPT | Performed by: NURSE PRACTITIONER

## 2024-09-26 PROCEDURE — 3074F SYST BP LT 130 MM HG: CPT | Performed by: NURSE PRACTITIONER

## 2024-09-26 RX ORDER — HYDROCHLOROTHIAZIDE 25 MG/1
25 TABLET ORAL DAILY
Qty: 90 TABLET | Refills: 3 | Status: SHIPPED | OUTPATIENT
Start: 2024-09-26 | End: 2025-09-26

## 2024-09-26 RX ORDER — LISINOPRIL 40 MG/1
40 TABLET ORAL DAILY
Qty: 90 TABLET | Refills: 3 | Status: SHIPPED | OUTPATIENT
Start: 2024-09-26 | End: 2025-09-26

## 2024-09-26 RX ORDER — ATORVASTATIN CALCIUM 40 MG/1
40 TABLET, FILM COATED ORAL NIGHTLY
Qty: 90 TABLET | Refills: 3 | Status: SHIPPED | OUTPATIENT
Start: 2024-09-26 | End: 2025-09-26

## 2024-09-26 ASSESSMENT — PATIENT HEALTH QUESTIONNAIRE - PHQ9
2. FEELING DOWN, DEPRESSED OR HOPELESS: NOT AT ALL
1. LITTLE INTEREST OR PLEASURE IN DOING THINGS: NOT AT ALL
SUM OF ALL RESPONSES TO PHQ9 QUESTIONS 1 AND 2: 0

## 2024-09-26 ASSESSMENT — PAIN SCALES - GENERAL: PAINLEVEL: 2

## 2024-09-26 ASSESSMENT — ENCOUNTER SYMPTOMS
OCCASIONAL FEELINGS OF UNSTEADINESS: 0
DEPRESSION: 0
LOSS OF SENSATION IN FEET: 0

## 2024-09-26 NOTE — PROGRESS NOTES
Magdy Valdes is a 49 y.o. male     History Of Present Illness   Mr Valdes is a 49 year old obese male with hypertension and hyperlipidemia, here for a routine follow up. He denies any complaints of chest pain, shortness of breath, palpitations, lower extremity edema, dizziness or syncope. He does complain of bilateral knee pain, but attributes this to his arthritis.       Social HX  Social History     Tobacco Use    Smoking status: Never    Smokeless tobacco: Never   Substance Use Topics    Alcohol use: Yes    Drug use: Never          Family HX  No family history on file.       Review Of Systems   Review of Systems       Allergies  No Known Allergies       Vitals  There were no vitals taken for this visit.        Physical Exam  Physical Exam       Current/Home Meds    Current Outpatient Medications:     atorvastatin (Lipitor) 20 mg tablet, Take 1 tablet (20 mg) by mouth once daily at bedtime., Disp: 30 tablet, Rfl: 3    hydroCHLOROthiazide (HYDRODiuril) 25 mg tablet, Take 1 tablet (25 mg) by mouth once daily., Disp: 90 tablet, Rfl: 3    lisinopril 40 mg tablet, Take 1 tablet (40 mg) by mouth once daily., Disp: 90 tablet, Rfl: 3       Labs           EKG Findings  EKG: previous EKG showed NSR        Cardiac Service Results:  ECHOCARDIOGRAM  [2021] : EF 55-60% WITH GRADE I DIASTOLIC DYSFUNCTION       2021  LM 22  LAD 26  LCx 25  RCA 24     Total 97    Assessment/Plan      CORONARY ARTERY DISEASE:  He denies any complaints of chest pain, shortness of breath, dizziness or syncope.  Will continue asa and a statin.  He is to consume a low sodium, low saturated fat diet and exercise for 150 mins a week.  I would like him to lose 1-2 pounds per week.  He would like to under go a repeat CT calcium score in 2025 to reassess his CV burden.      BP today is very well controlled at 108/70. Continue lisinopril and hydrochlorothiazide.    HYPERLIPIDEMIA:  last lipid panel in March showed an LDL of 132 (down from 171).  He admits he  is not consuming a healthy diet.  Again, we discussed the importance of lifestyle modifications.  He will need a repeat lipid panel prior to his next follow up.

## 2025-02-10 ENCOUNTER — OFFICE VISIT (OUTPATIENT)
Dept: PRIMARY CARE | Facility: CLINIC | Age: 50
End: 2025-02-10
Payer: COMMERCIAL

## 2025-02-10 VITALS
BODY MASS INDEX: 37.56 KG/M2 | HEART RATE: 85 BPM | OXYGEN SATURATION: 99 % | TEMPERATURE: 98.1 F | RESPIRATION RATE: 16 BRPM | DIASTOLIC BLOOD PRESSURE: 64 MMHG | WEIGHT: 239.8 LBS | SYSTOLIC BLOOD PRESSURE: 104 MMHG

## 2025-02-10 DIAGNOSIS — R05.1 ACUTE COUGH: ICD-10-CM

## 2025-02-10 DIAGNOSIS — R53.81 MALAISE: ICD-10-CM

## 2025-02-10 DIAGNOSIS — R09.81 NASAL CONGESTION: ICD-10-CM

## 2025-02-10 DIAGNOSIS — J11.1 INFLUENZA: Primary | ICD-10-CM

## 2025-02-10 LAB
POC RAPID INFLUENZA A: POSITIVE
POC RAPID INFLUENZA B: NEGATIVE

## 2025-02-10 PROCEDURE — 99214 OFFICE O/P EST MOD 30 MIN: CPT | Performed by: NURSE PRACTITIONER

## 2025-02-10 PROCEDURE — 1036F TOBACCO NON-USER: CPT | Performed by: NURSE PRACTITIONER

## 2025-02-10 PROCEDURE — 3078F DIAST BP <80 MM HG: CPT | Performed by: NURSE PRACTITIONER

## 2025-02-10 PROCEDURE — 3074F SYST BP LT 130 MM HG: CPT | Performed by: NURSE PRACTITIONER

## 2025-02-10 PROCEDURE — 87804 INFLUENZA ASSAY W/OPTIC: CPT | Performed by: NURSE PRACTITIONER

## 2025-02-10 RX ORDER — OSELTAMIVIR PHOSPHATE 75 MG/1
75 CAPSULE ORAL 2 TIMES DAILY
Qty: 10 CAPSULE | Refills: 0 | Status: SHIPPED | OUTPATIENT
Start: 2025-02-10 | End: 2025-02-15

## 2025-02-10 ASSESSMENT — PATIENT HEALTH QUESTIONNAIRE - PHQ9
SUM OF ALL RESPONSES TO PHQ9 QUESTIONS 1 AND 2: 0
1. LITTLE INTEREST OR PLEASURE IN DOING THINGS: NOT AT ALL
2. FEELING DOWN, DEPRESSED OR HOPELESS: NOT AT ALL

## 2025-02-10 NOTE — LETTER
2/10/2025     To whom it may concern:     Mr. Magdy Valdes is currently under my medical care. Please excuse him from work. Tentative return to work is 2/17/2025.     Sincerely,     ADE Negro

## 2025-02-10 NOTE — PROGRESS NOTES
Subjective   Patient ID: Magdy Valdes is a 49 y.o. male who presents for Cough.    HPI   Patient in office with complaint of nasal congestion, cough, body aches and malaise x 1 day. He agrees to testing for COVID, Flu, RSV.* No other symptoms or concerns today.      POCT Flu: Positive for Influenza A    Review of Systems  Constitutional: Positive for malaise, and body aches. Negative for fever, activity change, appetite change, chills, diaphoresis, fatigue and unexpected weight change.   HENT:  Positive for nasal congestion. Negative for ear discharge, ear pain, mouth sores, nosebleeds, postnasal drip and trouble swallowing; no loss of taste or smell.    Eyes:  Negative for pain, discharge, redness and itching.   Respiratory:  Positive for cough. Negative for apnea, choking, chest tightness, and stridor.    Cardiovascular:  Negative for chest pain, palpitations and leg swelling.   Gastrointestinal:  Negative for abdominal distention, abdominal pain, anal bleeding, blood in stool, constipation, diarrhea, nausea, rectal pain and vomiting.   Endocrine: Negative for cold intolerance and heat intolerance.   Genitourinary:  Negative for decreased urine volume, difficulty urinating, dyspareunia, dysuria, flank pain, frequency, hematuria, pelvic pain, urgency, vaginal bleeding, vaginal discharge and vaginal pain.   Musculoskeletal:  Negative for back pain, gait problem and neck pain.   Skin:  Negative for pallor and rash.   Neurological:  Negative for dizziness, syncope, weakness, light-headedness and headaches.   Hematological:  Negative for adenopathy. Does not bruise/bleed easily.   Psychiatric/Behavioral:  Negative for dysphoric mood. The patient is not nervous/anxious.       Objective   /64   Pulse 85   Temp 36.7 °C (98.1 °F) (Temporal)   Resp 16   Wt 109 kg (239 lb 12.8 oz)   SpO2 99%   BMI 37.56 kg/m²     Physical Exam  Constitutional:       Appearance: Normal appearance.   HENT:      Head: Normocephalic.       Right Ear: Tympanic membrane, ear canal and external ear normal.      Left Ear: Tympanic membrane, ear canal and external ear normal.      Nose: Congestion present.      Mouth/Throat:      Mouth: Mucous membranes are moist.      Pharynx: Oropharynx is clear. present. No oropharyngeal exudate.   Eyes:      General:         Right eye: No discharge.         Left eye: No discharge.      Conjunctiva/sclera: Conjunctivae normal.      Pupils: Pupils are equal, round, and reactive to light.   Cardiovascular:      Rate and Rhythm: Normal rate and regular rhythm.      Pulses: Normal pulses.      Heart sounds: Normal heart sounds.   Pulmonary:      Effort: Pulmonary effort is normal.      Breath sounds: Normal breath sounds.   Musculoskeletal:      Cervical back: Neck supple. No rigidity or tenderness.   Lymphadenopathy:      Cervical: No cervical adenopathy.   Skin:     Coloration: Skin is not jaundiced or pale.   Neurological:      General: No focal deficit present.      Mental Status: She is alert.   Psychiatric:         Mood and Affect: Mood normal.         Assessment/Plan     Exam findings reviewed with patient. Medications discussed with patient. The patient may use Tylenol for fever and pain control, Mucinex for cough, and Flonase for nasal congestion. Advocated rest and proper hydration and standard Influenza precautions. Emergent signs and symptoms reviewed: uncontrolled fever, shortness of breath or chest pain, SaO2 level <95% on room air, uncontrolled vomiting or diarrhea. The patient verbalized understanding. The patient knows where to get emergent help. We will call with lab results and update the patient on the plan of care. Follow up in 1 week or earlier if no improvement.

## 2025-02-11 LAB
FLUAV RNA RESP QL NAA+PROBE: DETECTED
FLUBV RNA RESP QL NAA+PROBE: NOT DETECTED
RSV RNA RESP QL NAA+PROBE: NOT DETECTED
SARS-COV-2 RNA RESP QL NAA+PROBE: NOT DETECTED

## 2025-07-24 ENCOUNTER — APPOINTMENT (OUTPATIENT)
Dept: PRIMARY CARE | Facility: CLINIC | Age: 50
End: 2025-07-24
Payer: COMMERCIAL

## 2025-07-28 ENCOUNTER — APPOINTMENT (OUTPATIENT)
Dept: PRIMARY CARE | Facility: CLINIC | Age: 50
End: 2025-07-28
Payer: COMMERCIAL

## 2025-08-25 ENCOUNTER — APPOINTMENT (OUTPATIENT)
Dept: PRIMARY CARE | Facility: CLINIC | Age: 50
End: 2025-08-25
Payer: COMMERCIAL

## 2025-08-25 VITALS
HEART RATE: 66 BPM | TEMPERATURE: 98.2 F | BODY MASS INDEX: 36.81 KG/M2 | WEIGHT: 235 LBS | DIASTOLIC BLOOD PRESSURE: 74 MMHG | SYSTOLIC BLOOD PRESSURE: 111 MMHG | OXYGEN SATURATION: 96 %

## 2025-08-25 DIAGNOSIS — Z12.83 SCREENING FOR SKIN CANCER: ICD-10-CM

## 2025-08-25 DIAGNOSIS — Z12.5 SCREENING FOR PROSTATE CANCER: ICD-10-CM

## 2025-08-25 DIAGNOSIS — E66.9 OBESITY (BMI 30-39.9): ICD-10-CM

## 2025-08-25 DIAGNOSIS — D64.9 ANEMIA, UNSPECIFIED TYPE: ICD-10-CM

## 2025-08-25 DIAGNOSIS — I10 HYPERTENSION, UNSPECIFIED TYPE: Primary | ICD-10-CM

## 2025-08-25 DIAGNOSIS — Z00.00 HEALTHCARE MAINTENANCE: ICD-10-CM

## 2025-08-25 DIAGNOSIS — D22.9 MULTIPLE NEVI: ICD-10-CM

## 2025-08-25 PROCEDURE — 3074F SYST BP LT 130 MM HG: CPT | Performed by: NURSE PRACTITIONER

## 2025-08-25 PROCEDURE — 3078F DIAST BP <80 MM HG: CPT | Performed by: NURSE PRACTITIONER

## 2025-08-25 PROCEDURE — 99396 PREV VISIT EST AGE 40-64: CPT | Performed by: NURSE PRACTITIONER

## 2025-08-25 PROCEDURE — 1036F TOBACCO NON-USER: CPT | Performed by: NURSE PRACTITIONER

## 2025-08-25 RX ORDER — ALBUTEROL SULFATE 90 UG/1
2 INHALANT RESPIRATORY (INHALATION) DAILY PRN
COMMUNITY
Start: 2025-02-13 | End: 2025-08-25 | Stop reason: ALTCHOICE

## 2025-08-25 ASSESSMENT — ENCOUNTER SYMPTOMS: DEPRESSION: 0

## 2025-08-25 ASSESSMENT — PAIN SCALES - GENERAL: PAINLEVEL_OUTOF10: 0-NO PAIN

## 2025-08-26 LAB
ALBUMIN SERPL-MCNC: 4.6 G/DL (ref 3.6–5.1)
ALP SERPL-CCNC: 102 U/L (ref 35–144)
ALT SERPL-CCNC: 33 U/L (ref 9–46)
ANION GAP SERPL CALCULATED.4IONS-SCNC: 12 MMOL/L (CALC) (ref 7–17)
AST SERPL-CCNC: 25 U/L (ref 10–35)
BASOPHILS # BLD AUTO: 20 CELLS/UL (ref 0–200)
BASOPHILS NFR BLD AUTO: 0.3 %
BILIRUB SERPL-MCNC: 1.3 MG/DL (ref 0.2–1.2)
BUN SERPL-MCNC: 21 MG/DL (ref 7–25)
CALCIUM SERPL-MCNC: 9.2 MG/DL (ref 8.6–10.3)
CHLORIDE SERPL-SCNC: 104 MMOL/L (ref 98–110)
CHOLEST SERPL-MCNC: 176 MG/DL
CHOLEST/HDLC SERPL: 4.1 (CALC)
CO2 SERPL-SCNC: 22 MMOL/L (ref 20–32)
CREAT SERPL-MCNC: 1.27 MG/DL (ref 0.7–1.3)
EGFRCR SERPLBLD CKD-EPI 2021: 69 ML/MIN/1.73M2
EOSINOPHIL # BLD AUTO: 139 CELLS/UL (ref 15–500)
EOSINOPHIL NFR BLD AUTO: 2.1 %
ERYTHROCYTE [DISTWIDTH] IN BLOOD BY AUTOMATED COUNT: 13 % (ref 11–15)
GLUCOSE SERPL-MCNC: 111 MG/DL (ref 65–99)
HCT VFR BLD AUTO: 45.5 % (ref 38.5–50)
HDLC SERPL-MCNC: 43 MG/DL
HGB BLD-MCNC: 15.5 G/DL (ref 13.2–17.1)
LDLC SERPL CALC-MCNC: 111 MG/DL (CALC)
LYMPHOCYTES # BLD AUTO: 1129 CELLS/UL (ref 850–3900)
LYMPHOCYTES NFR BLD AUTO: 17.1 %
MCH RBC QN AUTO: 34.2 PG (ref 27–33)
MCHC RBC AUTO-ENTMCNC: 34.1 G/DL (ref 32–36)
MCV RBC AUTO: 100.4 FL (ref 80–100)
MONOCYTES # BLD AUTO: 409 CELLS/UL (ref 200–950)
MONOCYTES NFR BLD AUTO: 6.2 %
NEUTROPHILS # BLD AUTO: 4904 CELLS/UL (ref 1500–7800)
NEUTROPHILS NFR BLD AUTO: 74.3 %
NONHDLC SERPL-MCNC: 133 MG/DL (CALC)
PLATELET # BLD AUTO: 211 THOUSAND/UL (ref 140–400)
PMV BLD REES-ECKER: 10.9 FL (ref 7.5–12.5)
POTASSIUM SERPL-SCNC: 4.9 MMOL/L (ref 3.5–5.3)
PROT SERPL-MCNC: 7 G/DL (ref 6.1–8.1)
PSA SERPL-MCNC: 0.52 NG/ML
RBC # BLD AUTO: 4.53 MILLION/UL (ref 4.2–5.8)
SODIUM SERPL-SCNC: 138 MMOL/L (ref 135–146)
TRIGL SERPL-MCNC: 112 MG/DL
TSH SERPL-ACNC: 3.23 MIU/L (ref 0.4–4.5)
WBC # BLD AUTO: 6.6 THOUSAND/UL (ref 3.8–10.8)